# Patient Record
Sex: FEMALE | Race: WHITE | HISPANIC OR LATINO | Employment: FULL TIME | URBAN - METROPOLITAN AREA
[De-identification: names, ages, dates, MRNs, and addresses within clinical notes are randomized per-mention and may not be internally consistent; named-entity substitution may affect disease eponyms.]

---

## 2022-01-06 ENCOUNTER — TELEPHONE (OUTPATIENT)
Dept: PODIATRY | Facility: CLINIC | Age: 32
End: 2022-01-06
Payer: MEDICAID

## 2022-01-06 NOTE — TELEPHONE ENCOUNTER
Spoke with pt about the earliest appt Dr patel had pt states she was fine with the date and time and I made the appt for pt           ----- Message from Brandy Patel sent at 1/6/2022  8:39 AM CST -----  Regarding: pt  Patient Requesting Sooner Appointment.     Reason for sooner appt.: pt is calling to speak with the nurse pt is coming in for foot pain   When is the first available appointment? N/A   Communication Preference can you please call pt at [phone]  Additional Information none    VI

## 2022-03-07 ENCOUNTER — PATIENT MESSAGE (OUTPATIENT)
Dept: PODIATRY | Facility: CLINIC | Age: 32
End: 2022-03-07

## 2022-03-07 ENCOUNTER — OFFICE VISIT (OUTPATIENT)
Dept: PODIATRY | Facility: CLINIC | Age: 32
End: 2022-03-07
Payer: MEDICAID

## 2022-03-07 DIAGNOSIS — M79.672 BILATERAL FOOT PAIN: Primary | ICD-10-CM

## 2022-03-07 DIAGNOSIS — M72.2 PLANTAR FASCIITIS: ICD-10-CM

## 2022-03-07 DIAGNOSIS — M79.671 BILATERAL FOOT PAIN: Primary | ICD-10-CM

## 2022-03-07 PROCEDURE — 1159F MED LIST DOCD IN RCRD: CPT | Mod: CPTII,,, | Performed by: PODIATRIST

## 2022-03-07 PROCEDURE — 99999 PR PBB SHADOW E&M-EST. PATIENT-LVL I: CPT | Mod: PBBFAC,,, | Performed by: PODIATRIST

## 2022-03-07 PROCEDURE — 99203 OFFICE O/P NEW LOW 30 MIN: CPT | Mod: S$PBB,,, | Performed by: PODIATRIST

## 2022-03-07 PROCEDURE — 99211 OFF/OP EST MAY X REQ PHY/QHP: CPT | Mod: PBBFAC,PN | Performed by: PODIATRIST

## 2022-03-07 PROCEDURE — 1159F PR MEDICATION LIST DOCUMENTED IN MEDICAL RECORD: ICD-10-PCS | Mod: CPTII,,, | Performed by: PODIATRIST

## 2022-03-07 PROCEDURE — 99203 PR OFFICE/OUTPT VISIT, NEW, LEVL III, 30-44 MIN: ICD-10-PCS | Mod: S$PBB,,, | Performed by: PODIATRIST

## 2022-03-07 PROCEDURE — 99999 PR PBB SHADOW E&M-EST. PATIENT-LVL I: ICD-10-PCS | Mod: PBBFAC,,, | Performed by: PODIATRIST

## 2022-03-07 RX ORDER — DICLOFENAC SODIUM 10 MG/G
2 GEL TOPICAL 4 TIMES DAILY
Qty: 350 G | Refills: 2 | Status: SHIPPED | OUTPATIENT
Start: 2022-03-07 | End: 2022-10-20

## 2022-03-07 NOTE — PROGRESS NOTES
Subjective:      Patient ID: Nikki Scruggs is a 31 y.o. female.    Chief Complaint: No chief complaint on file.    Nikki is a 31 y.o. female who presents to the podiatry clinic  with complaint of  bilateral foot pain, L>R. Onset of the symptoms was 6 months to a year ago L foot while R started Fri.middle of the night & then weight bearing. This is 3rd episode/flare-up. Precipitating event: increased activity. Patient works 10-12 hr.shift @ [employer]. Current symptoms include: ability to bear weight, but with some pain. Patient has had prior foot problems. States she 1st developed these symptoms after 1st & 2nd child. Patient rates pain 10/10 on pain scale. Helped temporarily by massage tx by  or massage gun.  Wears work shoes out or slippers @ home.    Past Medical History:   Diagnosis Date    ADHD (attention deficit hyperactivity disorder)     Anxiety     [facility]P: [facility] - Dr. Woodruff annually     Objective:      Review of Systems   Constitutional: Negative for malaise/fatigue.   Cardiovascular: Negative for claudication and leg swelling.   Skin: Negative for color change, dry skin and suspicious lesions.   Musculoskeletal: Positive for myalgias. Negative for falls, joint pain and muscle weakness.   Neurological: Negative for focal weakness, loss of balance, numbness and paresthesias.   Psychiatric/Behavioral: The patient is not nervous/anxious.      Physical Exam  Constitutional:       Appearance: Normal appearance. She is normal weight.   Cardiovascular:      Pulses:           Dorsalis pedis pulses are 2+ on the right side and 2+ on the left side.   Musculoskeletal:         General: Tenderness present. No swelling or signs of injury.      Right lower leg: No edema.      Left lower leg: No edema.      Right foot: Normal range of motion.      Left foot: Normal range of motion.   Feet:      Right foot:      Skin integrity: Skin integrity normal.      Left foot:      Skin integrity:  Skin integrity normal.      Comments: Tenderness at the insertion of fascia into the calcaneus plantar medial and central heel or greater than right.  No radiating pain distally nor plantarly.  No tenderness along the PT tendon bilaterally.  Skin:     General: Skin is warm and dry.      Capillary Refill: Capillary refill takes less than 2 seconds.      Findings: No bruising, erythema or lesion.   Neurological:      Mental Status: She is alert and oriented to person, place, and time.      Sensory: No sensory deficit.      Motor: No weakness.      Gait: Gait normal.   Psychiatric:         Mood and Affect: Mood and affect normal.         Behavior: Behavior normal. Behavior is cooperative.         Assessment:      Encounter Diagnoses   Name Primary?    Bilateral foot pain Yes    Plantar fasciitis        Problem List Items Addressed This Visit    None     Visit Diagnoses     Bilateral foot pain    -  Primary    Plantar fasciitis            Plan:       Diagnoses and all orders for this visit:    Bilateral foot pain    Plantar fasciitis    Other orders  -     diclofenac sodium (VOLTAREN) 1 % Gel; Apply 2 g topically 4 (four) times daily.    I counseled the patient on her conditions, their implications and medical management.    - Shoe inspection. We discussed wearing proper supportive shoe gear at all times weight-bearing especially since she does work long shifts, any even in the home.      Dispensed gelstraps for B/L midfoot in shoes without suppot.    PPT arch pads added to current shoes.    To purchase Spenco arch orthotics for later modification after resolution of symptomatology.    F/u 4-6 wks., sooner prn

## 2022-03-07 NOTE — PATIENT INSTRUCTIONS
Visco-gel universal metatarsal strap - large/xlarge left & right    PPT medium arch pads w/ adhesive    Spenco arch orthotic 3/4 length

## 2022-03-08 ENCOUNTER — TELEPHONE (OUTPATIENT)
Dept: PODIATRY | Facility: CLINIC | Age: 32
End: 2022-03-08
Payer: MEDICAID

## 2022-03-08 NOTE — TELEPHONE ENCOUNTER
----- Message from Ros Morrison sent at 3/8/2022 10:44 AM CST -----  Contact: patient  Pt received a missed call in regards to rescheduling appt    Call back pt @[phone]

## 2022-04-27 ENCOUNTER — OFFICE VISIT (OUTPATIENT)
Dept: PODIATRY | Facility: CLINIC | Age: 32
End: 2022-04-27
Payer: MEDICAID

## 2022-04-27 VITALS
BODY MASS INDEX: 28.53 KG/M2 | SYSTOLIC BLOOD PRESSURE: 126 MMHG | HEART RATE: 68 BPM | WEIGHT: 161 LBS | DIASTOLIC BLOOD PRESSURE: 75 MMHG | HEIGHT: 63 IN

## 2022-04-27 DIAGNOSIS — M25.572 PAIN IN JOINT INVOLVING ANKLE AND FOOT, LEFT: ICD-10-CM

## 2022-04-27 DIAGNOSIS — M72.2 PLANTAR FASCIITIS: Primary | ICD-10-CM

## 2022-04-27 DIAGNOSIS — M79.673 HEEL PAIN, UNSPECIFIED LATERALITY: ICD-10-CM

## 2022-04-27 DIAGNOSIS — M12.871 TRANSIENT ARTHROPATHY INVOLVING RIGHT ANKLE AND FOOT: ICD-10-CM

## 2022-04-27 PROCEDURE — 3008F PR BODY MASS INDEX (BMI) DOCUMENTED: ICD-10-PCS | Mod: CPTII,,, | Performed by: PODIATRIST

## 2022-04-27 PROCEDURE — 99213 OFFICE O/P EST LOW 20 MIN: CPT | Mod: PBBFAC,PN | Performed by: PODIATRIST

## 2022-04-27 PROCEDURE — 99214 PR OFFICE/OUTPT VISIT, EST, LEVL IV, 30-39 MIN: ICD-10-PCS | Mod: S$PBB,,, | Performed by: PODIATRIST

## 2022-04-27 PROCEDURE — 1159F MED LIST DOCD IN RCRD: CPT | Mod: CPTII,,, | Performed by: PODIATRIST

## 2022-04-27 PROCEDURE — 3074F PR MOST RECENT SYSTOLIC BLOOD PRESSURE < 130 MM HG: ICD-10-PCS | Mod: CPTII,,, | Performed by: PODIATRIST

## 2022-04-27 PROCEDURE — 1159F PR MEDICATION LIST DOCUMENTED IN MEDICAL RECORD: ICD-10-PCS | Mod: CPTII,,, | Performed by: PODIATRIST

## 2022-04-27 PROCEDURE — 99214 OFFICE O/P EST MOD 30 MIN: CPT | Mod: S$PBB,,, | Performed by: PODIATRIST

## 2022-04-27 PROCEDURE — 99999 PR PBB SHADOW E&M-EST. PATIENT-LVL III: ICD-10-PCS | Mod: PBBFAC,,, | Performed by: PODIATRIST

## 2022-04-27 PROCEDURE — 3078F PR MOST RECENT DIASTOLIC BLOOD PRESSURE < 80 MM HG: ICD-10-PCS | Mod: CPTII,,, | Performed by: PODIATRIST

## 2022-04-27 PROCEDURE — 3074F SYST BP LT 130 MM HG: CPT | Mod: CPTII,,, | Performed by: PODIATRIST

## 2022-04-27 PROCEDURE — 3008F BODY MASS INDEX DOCD: CPT | Mod: CPTII,,, | Performed by: PODIATRIST

## 2022-04-27 PROCEDURE — 3078F DIAST BP <80 MM HG: CPT | Mod: CPTII,,, | Performed by: PODIATRIST

## 2022-04-27 PROCEDURE — 99999 PR PBB SHADOW E&M-EST. PATIENT-LVL III: CPT | Mod: PBBFAC,,, | Performed by: PODIATRIST

## 2022-04-27 NOTE — PROGRESS NOTES
Subjective:      Patient ID: Nikki Scruggs is a 31 y.o. female.    Chief Complaint: No chief complaint on file.    Nikki is a 31 y.o. female who presents for follow-up of  bilateral foot pain, L>>R x 6 months to a year ago L foot (R started over 2 months ago). Patient works 10-12 hr.shift @ [employer]. Current symptoms include: ability to bear weight, but with some pain. Patient has had prior episodes - 1st ssx after 1st & 2nd child. Patient rates pain 8/10 on pain scale.Has PPT arch pads in work shoes. Wears work shoes out or slippers @ home.    Past Medical History:   Diagnosis Date    ADHD (attention deficit hyperactivity disorder)     Anxiety     [facility]P: [facility] - Dr. Woodruff annually     Objective:      Physical Exam  Vitals reviewed.   Constitutional:       Appearance: Normal appearance. She is overweight.   Cardiovascular:      Pulses:           Dorsalis pedis pulses are 2+ on the right side and 2+ on the left side.   Musculoskeletal:         General: Tenderness present. No swelling or signs of injury.      Right foot: Normal range of motion. Deformity (flexible cavus B/L) present.      Left foot: Normal range of motion. Deformity present.   Feet:      Right foot:      Skin integrity: Skin integrity normal.      Left foot:      Skin integrity: Skin integrity normal.      Comments: Tenderness at the insertion of fascia into the calcaneus plantar medial L>R & posterior central heel.  No radiating pain. No tenderness along the PT tendon bilaterally.   Current shoes without adequate support arch B/L  Skin:     General: Skin is warm and dry.      Capillary Refill: Capillary refill takes less than 2 seconds.      Findings: No bruising or erythema.   Neurological:      Mental Status: She is alert and oriented to person, place, and time.      Sensory: No sensory deficit.      Motor: No weakness.      Gait: Gait normal.   Psychiatric:         Mood and Affect: Mood and affect normal.          "Speech: Speech is rapid and pressured.         Behavior: Behavior normal. Behavior is cooperative.         Assessment:      Encounter Diagnoses   Name Primary?    Plantar fasciitis Yes    Transient arthropathy involving right ankle and foot     Pain in joint involving ankle and foot, left     Heel pain, unspecified laterality        Problem List Items Addressed This Visit    None     Visit Diagnoses     Plantar fasciitis    -  Primary    Transient arthropathy involving right ankle and foot        Pain in joint involving ankle and foot, left        Heel pain, unspecified laterality        Relevant Orders    HEEL LIFTS FOR HOME USE        Plan:       Diagnoses and all orders for this visit:    Plantar fasciitis    Transient arthropathy involving right ankle and foot    Pain in joint involving ankle and foot, left    Heel pain, unspecified laterality  -     HEEL LIFTS FOR HOME USE    I counseled the patient on her conditions, their implications and medical management.    - Shoe inspection. We discussed wearing proper supportive shoe gear at all times weight-bearing especially since she does work long shifts, and even in the home.      Continue gelstraps for B/L midfoot in shoes without support such as at home.    Double PPT arch pads added to current New Balance tennis shoes.    Dispensed pPT 1/4" heel pads for cushioning.    Continue use PPT arch pads in her work shoes.    Continue use of q.i.d. Voltaren gel to affected areas (moisturizer if drying)    To purchase Spenco arch orthotics for later modification after resolution of symptomatology.    Information on types of pads dispensed are provided well as     F/u 4-6 wks., sooner prn      "

## 2022-04-27 NOTE — PATIENT INSTRUCTIONS
PPT arch pads w/ gel - small and medium    PPT heel pads w/ adhesive    Spenco arch orthotics 3/4 length - size down

## 2022-09-08 ENCOUNTER — OFFICE VISIT (OUTPATIENT)
Dept: PODIATRY | Facility: CLINIC | Age: 32
End: 2022-09-08
Payer: MEDICAID

## 2022-09-08 VITALS
HEART RATE: 63 BPM | BODY MASS INDEX: 28.53 KG/M2 | HEIGHT: 63 IN | WEIGHT: 161 LBS | DIASTOLIC BLOOD PRESSURE: 70 MMHG | SYSTOLIC BLOOD PRESSURE: 107 MMHG

## 2022-09-08 DIAGNOSIS — M79.671 PAIN OF BOTH HEELS: ICD-10-CM

## 2022-09-08 DIAGNOSIS — M79.672 PAIN OF BOTH HEELS: ICD-10-CM

## 2022-09-08 DIAGNOSIS — M72.2 PLANTAR FASCIITIS: Primary | ICD-10-CM

## 2022-09-08 PROCEDURE — 99999 PR PBB SHADOW E&M-EST. PATIENT-LVL III: CPT | Mod: PBBFAC,,, | Performed by: PODIATRIST

## 2022-09-08 PROCEDURE — 1159F MED LIST DOCD IN RCRD: CPT | Mod: CPTII,,, | Performed by: PODIATRIST

## 2022-09-08 PROCEDURE — 3008F BODY MASS INDEX DOCD: CPT | Mod: CPTII,,, | Performed by: PODIATRIST

## 2022-09-08 PROCEDURE — 99999 PR PBB SHADOW E&M-EST. PATIENT-LVL III: ICD-10-PCS | Mod: PBBFAC,,, | Performed by: PODIATRIST

## 2022-09-08 PROCEDURE — 99213 OFFICE O/P EST LOW 20 MIN: CPT | Mod: S$PBB,,, | Performed by: PODIATRIST

## 2022-09-08 PROCEDURE — 99213 PR OFFICE/OUTPT VISIT, EST, LEVL III, 20-29 MIN: ICD-10-PCS | Mod: S$PBB,,, | Performed by: PODIATRIST

## 2022-09-08 PROCEDURE — 3078F DIAST BP <80 MM HG: CPT | Mod: CPTII,,, | Performed by: PODIATRIST

## 2022-09-08 PROCEDURE — 3078F PR MOST RECENT DIASTOLIC BLOOD PRESSURE < 80 MM HG: ICD-10-PCS | Mod: CPTII,,, | Performed by: PODIATRIST

## 2022-09-08 PROCEDURE — 3008F PR BODY MASS INDEX (BMI) DOCUMENTED: ICD-10-PCS | Mod: CPTII,,, | Performed by: PODIATRIST

## 2022-09-08 PROCEDURE — 99213 OFFICE O/P EST LOW 20 MIN: CPT | Mod: PBBFAC,PN | Performed by: PODIATRIST

## 2022-09-08 PROCEDURE — 1159F PR MEDICATION LIST DOCUMENTED IN MEDICAL RECORD: ICD-10-PCS | Mod: CPTII,,, | Performed by: PODIATRIST

## 2022-09-08 PROCEDURE — 3074F SYST BP LT 130 MM HG: CPT | Mod: CPTII,,, | Performed by: PODIATRIST

## 2022-09-08 PROCEDURE — 3074F PR MOST RECENT SYSTOLIC BLOOD PRESSURE < 130 MM HG: ICD-10-PCS | Mod: CPTII,,, | Performed by: PODIATRIST

## 2022-09-08 RX ORDER — METHYLPREDNISOLONE 4 MG/1
TABLET ORAL
Qty: 2 EACH | Refills: 1 | Status: SHIPPED | OUTPATIENT
Start: 2022-09-08

## 2022-09-08 NOTE — PROGRESS NOTES
Subjective:      Patient ID: Nikki Scruggs is a 32 y.o. female.    Chief Complaint: No chief complaint on file.    Nikki is a 32 y.o. female who presents after an absence of over 4 months.  States that she is having pain to the right foot about pain level 10/10 while the left is about a 4 or 5/10 to the heel.  Wearing pair flat canvas shoes today but states she got a pair of inserts that she put in less than a week ago.   Previously seen here in April for foot pain, R>L for over a year. Arch pads B/L in work shoes & tennis shoes but barefoot or slippers @ home.  Patient works 10-12 hr.shift @ [employer].     Past Medical History:   Diagnosis Date    ADHD (attention deficit hyperactivity disorder)     Anxiety       [facility]: [facility] - Dr. Woodruff annually     Objective:      Physical Exam  Vitals reviewed.   Constitutional:       Appearance: Normal appearance. She is overweight.   Cardiovascular:      Pulses:           Dorsalis pedis pulses are 2+ on the right side and 2+ on the left side.   Musculoskeletal:         General: Tenderness present. No swelling.      Right foot: Normal range of motion. Deformity (flexible cavus B/L) present.      Left foot: Normal range of motion. Deformity present.   Feet:      Right foot:      Skin integrity: Skin integrity normal.      Left foot:      Skin integrity: Skin integrity normal.      Comments: Tenderness @ insertion of fascia into the calcaneus plantar medial & heel B/L.  No radiating pain. No tenderness along the PT tendon B/L.  Current shoes w/ new inserts.  Skin:     General: Skin is warm and dry.      Capillary Refill: Capillary refill takes less than 2 seconds.      Findings: No bruising or erythema.   Neurological:      Mental Status: She is alert and oriented to person, place, and time.      Sensory: No sensory deficit.      Motor: No weakness.      Gait: Gait normal.   Psychiatric:         Attention and Perception: She is inattentive.         Mood  and Affect: Mood and affect normal.         Behavior: Behavior normal. Behavior is cooperative.       Assessment:      Encounter Diagnoses   Name Primary?    Plantar fasciitis Yes    Pain of both heels        Problem List Items Addressed This Visit    None  Visit Diagnoses       Plantar fasciitis    -  Primary    Pain of both heels              Plan:       Diagnoses and all orders for this visit:    Plantar fasciitis    Pain of both heels    Other orders  -     methylPREDNISolone (MEDROL DOSEPACK) 4 mg tablet; use as directed, alternating packs so that you are taking 6 tablets 1st 2 days, 5 next 2 days etc.    I counseled the patient on her conditions, their implications and medical management.    - Shoe inspection. We discussed wearing proper supportive shoe gear at all times WB, even in the home.      Use gel straps and/ or PPT arch pads or new inserts    Has previous double PPT arch pads added to current New Balance tennis shoes.    Use q.i.d. Voltaren gel to affected areas.    Information on types of pads dispensed are provided.    F/u 4-6 wks., sooner prn

## 2022-09-08 NOTE — PATIENT INSTRUCTIONS
PPT arch pads w/ adhesive - small    Visco-gel universal metatarsal strap - small/medium - right or left

## 2022-10-12 ENCOUNTER — OFFICE VISIT (OUTPATIENT)
Dept: PODIATRY | Facility: CLINIC | Age: 32
End: 2022-10-12
Payer: MEDICAID

## 2022-10-12 VITALS
SYSTOLIC BLOOD PRESSURE: 93 MMHG | HEART RATE: 82 BPM | HEIGHT: 63 IN | WEIGHT: 155.31 LBS | DIASTOLIC BLOOD PRESSURE: 69 MMHG | BODY MASS INDEX: 27.52 KG/M2

## 2022-10-12 DIAGNOSIS — M79.673 HEEL PAIN, UNSPECIFIED LATERALITY: Primary | ICD-10-CM

## 2022-10-12 DIAGNOSIS — M72.2 PLANTAR FASCIITIS: ICD-10-CM

## 2022-10-12 PROCEDURE — 99999 PR PBB SHADOW E&M-EST. PATIENT-LVL III: ICD-10-PCS | Mod: PBBFAC,,, | Performed by: PODIATRIST

## 2022-10-12 PROCEDURE — 1159F MED LIST DOCD IN RCRD: CPT | Mod: CPTII,,, | Performed by: PODIATRIST

## 2022-10-12 PROCEDURE — 99213 OFFICE O/P EST LOW 20 MIN: CPT | Mod: S$PBB,,, | Performed by: PODIATRIST

## 2022-10-12 PROCEDURE — 3074F SYST BP LT 130 MM HG: CPT | Mod: CPTII,,, | Performed by: PODIATRIST

## 2022-10-12 PROCEDURE — 3078F DIAST BP <80 MM HG: CPT | Mod: CPTII,,, | Performed by: PODIATRIST

## 2022-10-12 PROCEDURE — 99213 PR OFFICE/OUTPT VISIT, EST, LEVL III, 20-29 MIN: ICD-10-PCS | Mod: S$PBB,,, | Performed by: PODIATRIST

## 2022-10-12 PROCEDURE — 99999 PR PBB SHADOW E&M-EST. PATIENT-LVL III: CPT | Mod: PBBFAC,,, | Performed by: PODIATRIST

## 2022-10-12 PROCEDURE — 99213 OFFICE O/P EST LOW 20 MIN: CPT | Mod: PBBFAC,PN | Performed by: PODIATRIST

## 2022-10-12 PROCEDURE — 3074F PR MOST RECENT SYSTOLIC BLOOD PRESSURE < 130 MM HG: ICD-10-PCS | Mod: CPTII,,, | Performed by: PODIATRIST

## 2022-10-12 PROCEDURE — 1159F PR MEDICATION LIST DOCUMENTED IN MEDICAL RECORD: ICD-10-PCS | Mod: CPTII,,, | Performed by: PODIATRIST

## 2022-10-12 PROCEDURE — 3078F PR MOST RECENT DIASTOLIC BLOOD PRESSURE < 80 MM HG: ICD-10-PCS | Mod: CPTII,,, | Performed by: PODIATRIST

## 2022-10-12 NOTE — PROGRESS NOTES
Subjective:      Patient ID: Nikki Scruggs is a 32 y.o. female.    Chief Complaint: No chief complaint on file.    Nikki is a 32 y.o. female who presents for F/U pain R foot -  pain level  was10/10 while L was 4 or 5/10 to the heel.  States now pain is only about 4/10 @ worst B/L; she attributes this to having the flu and had being able to rest for at least a week.  Was improving with symptoms anyway.  Usually in Crocs for work and has new pair.  Previously was in flat canvas shoes w/ inserts < a week.   Previously in April foot pain, R>L for over a year. Arch pads B/L in work shoes & tennis shoes but barefoot or slippers @ home.    Patient works 10-12 hr.shift @ [employer].     Past Medical History:   Diagnosis Date    ADHD (attention deficit hyperactivity disorder)     Anxiety       [facility]P: [facility] - Dr. Woodruff annually     Objective:      Physical Exam  Vitals reviewed.   Constitutional:       Appearance: Normal appearance. She is overweight.   Cardiovascular:      Pulses:           Dorsalis pedis pulses are 2+ on the right side and 2+ on the left side.   Musculoskeletal:         General: Tenderness present. No swelling.      Right foot: Normal range of motion. Deformity (flexible cavus B/L) present.      Left foot: Normal range of motion. Deformity present.   Feet:      Right foot:      Skin integrity: Skin integrity normal.      Left foot:      Skin integrity: Skin integrity normal.      Comments: Tenderness @ insertion of fascia into the calcaneus B/L with no palpable fullness and no radiating pain along the PT tendon.    Current shoes w/ new inserts.  Skin:     General: Skin is warm and dry.      Capillary Refill: Capillary refill takes less than 2 seconds.      Findings: No bruising or erythema.   Neurological:      Mental Status: She is alert and oriented to person, place, and time.      Motor: No weakness.      Gait: Gait normal.   Psychiatric:         Attention and Perception: She is  inattentive.         Mood and Affect: Mood and affect normal.         Behavior: Behavior normal. Behavior is cooperative.       Assessment:      Encounter Diagnoses   Name Primary?    Heel pain, unspecified laterality Yes    Plantar fasciitis          Problem List Items Addressed This Visit    None  Visit Diagnoses       Heel pain, unspecified laterality    -  Primary    Plantar fasciitis              Plan:       Diagnoses and all orders for this visit:    Heel pain, unspecified laterality    Plantar fasciitis    I counseled the patient on her conditions, their implications and medical management.    - Shoe inspection. We discussed wearing proper supportive shoe gear at all times WB, even in the home.      Continue gel straps, double PPT arch pads (new balance tennis shoes) or new inserts to all shoe gear and at all times WB.    Continue up to q.i.d. Voltaren gel to affected areas p.r.n..    F/u prn.